# Patient Record
Sex: FEMALE | Race: BLACK OR AFRICAN AMERICAN | NOT HISPANIC OR LATINO | URBAN - METROPOLITAN AREA
[De-identification: names, ages, dates, MRNs, and addresses within clinical notes are randomized per-mention and may not be internally consistent; named-entity substitution may affect disease eponyms.]

---

## 2019-07-14 ENCOUNTER — EMERGENCY (EMERGENCY)
Facility: HOSPITAL | Age: 29
LOS: 1 days | Discharge: HOME | End: 2019-07-14
Admitting: EMERGENCY MEDICINE
Payer: SELF-PAY

## 2019-07-14 VITALS
DIASTOLIC BLOOD PRESSURE: 72 MMHG | SYSTOLIC BLOOD PRESSURE: 113 MMHG | RESPIRATION RATE: 18 BRPM | HEART RATE: 87 BPM | OXYGEN SATURATION: 98 % | TEMPERATURE: 98 F

## 2019-07-14 DIAGNOSIS — F10.129 ALCOHOL ABUSE WITH INTOXICATION, UNSPECIFIED: ICD-10-CM

## 2019-07-14 PROCEDURE — 99053 MED SERV 10PM-8AM 24 HR FAC: CPT

## 2019-07-14 PROCEDURE — 99284 EMERGENCY DEPT VISIT MOD MDM: CPT

## 2019-07-14 NOTE — ED ADULT TRIAGE NOTE - CHIEF COMPLAINT QUOTE
Pt. walked in with steady gait with  EMS after being found lying outside a hotel next to her vomit. Pt. admits to drinking alcohol tonight.

## 2019-07-14 NOTE — ED ADULT NURSE NOTE - NSIMPLEMENTINTERV_GEN_ALL_ED
Implemented All Fall Risk Interventions:  [place] to call system. Call bell, personal items and telephone within reach. Instruct patient to call for assistance. Room bathroom lighting operational. Non-slip footwear when patient is off stretcher. Physically safe environment: no spills, clutter or unnecessary equipment. Stretcher in lowest position, wheels locked, appropriate side rails in place. Provide visual cue, wrist band, yellow gown, etc. Monitor gait and stability. Monitor for mental status changes and reorient to person, place, and time. Review medications for side effects contributing to fall risk. Reinforce activity limits and safety measures with patient and family.

## 2019-07-15 NOTE — ED PROVIDER NOTE - CLINICAL SUMMARY MEDICAL DECISION MAKING FREE TEXT BOX
30 y/o F presents to ED with alcohol intoxication.  Pt well appearing, VSS.  NAD.  pt 28 y/o F presents to ED with alcohol intoxication.  Pt well appearing, VSS.  NAD.  No overt signs of trauma.  Pt observed in ED and phoned a friend to take her home.

## 2019-07-15 NOTE — ED PROVIDER NOTE - OBJECTIVE STATEMENT
30 y/o M, denies PMH, presents to ED via EMS with alcohol intoxication.  Pt states she was drinking alcohol at a rooftop bar in hotel.  She does not recall how she ended up in ED.  per triage note, pt found on ground, laying down next to her vomit.  She denies any complaints.

## 2023-05-24 NOTE — ED PROVIDER NOTE - CROS ED GI ALL NEG
[facility] DAILY PROGRESS NOTE  SUBJECTIVE:   Patient seen and examined at the bedside she remains on O2 per NC.  Patient appears mildly dyspneic with conversation.  Ambulatory oxygen test is noted.  CT chest rule out PE pending  Discussed with RN    OBJECTIVE:    VITAL SIGNS:     Vital Last Value 24 Hour Range   Temperature 98.2 °F (36.8 °C) (05/24/23 1633) Temp  Min: 97 °F (36.1 °C)  Max: 98.4 °F (36.9 °C)   Pulse 84 (05/24/23 1633) Pulse  Min: 72  Max: 93   Respiratory 15 (05/24/23 1132) Resp  Min: 15  Max: 16   Non-Invasive  Blood Pressure (!) 151/92 (05/24/23 1633) BP  Min: 130/87  Max: 157/99   Pulse Oximetry 92 % (05/24/23 1633) SpO2  Min: 92 %  Max: 96 %     Vital Today Admitted   Weight 86.5 kg (190 lb 11.2 oz) (05/22/23 2100) Weight: 84.6 kg (186 lb 8.2 oz) (05/22/23 1820)   Height N/A Height: 5' 3\" (160 cm) (05/22/23 2100)   Body Mass Index N/A BMI (Calculated): 33.78 (05/22/23 2100)     INTAKE/OUTPUT:      Intake/Output Summary (Last 24 hours) at 5/24/2023 1727  Last data filed at 5/24/2023 0856  Gross per 24 hour   Intake 1292.35 ml   Output --   Net 1292.35 ml         PHYSICAL EXAM:    Physical Exam  Constitutional:       Appearance: She is well-developed.   HENT:      Head: Normocephalic and atraumatic.   Eyes:      Conjunctiva/sclera: Conjunctivae normal.   Cardiovascular:      Rate and Rhythm: Normal rate and regular rhythm.      Heart sounds: Normal heart sounds.   Pulmonary:      Effort: Pulmonary effort is normal.      Breath sounds: Normal breath sounds.   Abdominal:      General: Bowel sounds are normal.      Palpations: Abdomen is soft.   Musculoskeletal:         General: No swelling. Normal range of motion.      Cervical back: Normal range of motion and neck supple.   Skin:     General: Skin is warm and dry.   Neurological:      Mental Status: She is alert and oriented to person, place, and time.      Comments: Left hand 4 out of 5  strength in right hand 5 out of 5  strength.  Otherwise  neurologically intact within the limits of the exam.  No limb drift.  Intact sensation to light touch.  Follows directions appropriately.  Clear speech.  Intact cranial nerves.   Psychiatric:         Behavior: Behavior normal.         Thought Content: Thought content normal.         Judgment: Judgment normal.          LABORATORY DATA:    CHEM7:  Sodium (mmol/L)   Date Value   05/24/2023 142     Potassium (mmol/L)   Date Value   05/24/2023 4.0     Chloride (mmol/L)   Date Value   05/24/2023 106     Glucose (mg/dL)   Date Value   05/24/2023 113 (H)     Calcium (mg/dL)   Date Value   05/24/2023 9.3     Carbon Dioxide (mmol/L)   Date Value   05/24/2023 27     BUN (mg/dL)   Date Value   05/24/2023 9     Creatinine (mg/dL)   Date Value   05/24/2023 0.98 (H)       CBC:  WBC (K/mcL)   Date Value   05/24/2023 8.0     RBC (mil/mcL)   Date Value   05/24/2023 4.47     HCT (%)   Date Value   05/24/2023 40.5     HGB (g/dL)   Date Value   05/24/2023 12.8     PLT (K/mcL)   Date Value   05/24/2023 296      Coags:  INR (no units)   Date Value   05/22/2023 1.0        Hepatic Panel:  GOT/AST (Units/L)   Date Value   05/23/2023 23     GPT/ALT (Units/L)   Date Value   05/23/2023 50     No results found for: GGTP  Alkaline Phosphatase (Units/L)   Date Value   05/23/2023 136 (H)     Bilirubin, Total (mg/dL)   Date Value   05/23/2023 0.3         IMAGING STUDIES:    MRI BRAIN WO CONTRAST   Final Result   Unremarkable MRI study of the brain without contrast.  No evidence of acute   or remote cerebral infarction, intracranial mass or extra-axial fluid   collection.      Electronically Signed by: IVANNA HERRERA M.D.    Signed on: 5/24/2023 11:11 AM    Workstation ID: [id number]      XR CHEST AP OR PA   Final Result   Normal contours the cardiomediastinal silhouette. No large airspace   opacity, pleural effusion or pneumothorax.      Electronically Signed by: NICHOLE MACIAS D.O.    Signed on: 5/22/2023 8:09 PM    Workstation ID:  [id number]      CT HEAD LEVEL 1   Final Result   1.    No CT evidence of acute intracranial process.     Patchy areas of decreased attenuation the periventricular and subcortical   white matter are most suggestive of chronic microvascular ischemic changes.      Dr. Escalante was notified of findings by Dr. Novoa of radiology at 6:54   PM on 05/22/2023.      Electronically Signed by: NICHOLE NOVOA D.O.    Signed on: 5/22/2023 6:56 PM    Workstation ID: [id number]      CTA HEAD AND NECK W CONTRAST LEVEL 1   Final Result         1. Normal CTA head.   2. No evidence of hemodynamically significant stenosis within the neck.    3. Cervical spondylosis.       Electronically Signed by: MARGARITA WOO MD    Signed on: 5/23/2023 8:48 AM    Workstation ID: [id number]      CT CEREBRAL PERFUSION W CONTRAST LEVEL 1   Final Result      Normal CT cerebral perfusion study.      Electronically Signed by: MARGARITA WOO MD    Signed on: 5/23/2023 12:11 PM    Workstation ID: [id number]                                  ASSESSMENT/PLAN:     Left-sided facial droop, dysarthria and left arm weakness  TIA/CVA ruled out  CT of the head: see above  CT cerebral perfusion as above  CTA head and neck as above  Neuro checks every 4 hours   Nursing bedside swallow eval  Continue statin and ASA  PT/OT/ST evaluation   Continue telemetry  No Hx A-fib, echo- no left atrial enlargement  Previous TTE 5/15: EF is 55%  Neurology consulted    Acute hypoxic respiratory failure etiology TBD  BNP 44  Chest x-ray 5/22 no acute findings  CT chest rule out PE pending     Acute kidney injury likely prerenal azotemia possibly 2/2 to ACE, diuretics  Creatinine 1.23-< 1.09->0.98 today, previously 0.9 on 5/16  Avoiding nephrotoxins  Supportive care with IV fluids     Mild hyperglycemia in setting of diabetes mellitus type 2  Hold metformin  Cover with SSI monitor Accu-Cheks  Last A1c 6.6 this admission     Hypertensive urgency in setting of primary  hypertension  Holding lisinopril and hydrochlorothiazide 2/2 #1  Start amlodipine 5 mg  Monitoring BP     Hyperlipidemia  Continue home statin and ASA     Obesity  BMI 33.78, encourage dietary modifications    Code Status    Code Status: Selective Treatment/DNR    DVT PPX: SCDs    DISPOSITION: Pending improvement.  MARY OLU 1-2 days    PCP:  Flory Rondon, CNP  [facility]  [phone]  Call for questions..    Discussed with Dr. Murali Martin         - - -

## 2025-04-09 NOTE — ED PROVIDER NOTE - RESPIRATORY NEGATIVE STATEMENT, MLM
[FreeTextEntry1] :  21-year-old Female with PMH of Graves' disease came for hyperthyroidism follow up  Graves' Disease Hx: In the year of 2022, Patient was having weight loss of 15 pounds in 2 months and fatigue and some body aches. Results from 4/16/22 showed an elevated FT4 6.6, suppressed TSH <0.01, TSI and TSH receptor AB: 14. She had COVID 19 January 2022. She was started on methimazole 15 mg PO BID and atenolol 25 mg once daily. Her results from 5/7/22 did show that her T4, T3 have normalized with lower free T4. Atenolol was stopped and methimazole was decreased to 15 mg. A repeat was done 5/31/22 and the TSH was 20.3 uIU/mL. methimazole was stopped for 3 days, and restart at 2.5 mg daily. She was later hyperthyroid again, and has had methimazole increased to 5 mg, weaned down to 2.5 mg.  Currently patient is taking methimazole 7.5 mg daily. Reports compliance with methimazole now  ROS: no weight loss no fatigue no abnormal menses, no hair loss, no diarrhea no tremors, no palpitations, no diaphoresis, no anxiety no abdominal pain, no nausea or vomiting, no fever, sore throat no double vision, no eye changes, no neck pain, no neck enlargement.  Patient was also noted to have chronic microcytic anemia since 2022, now seeing hematologist
no chest pain, no cough, and no shortness of breath.